# Patient Record
Sex: FEMALE | Race: WHITE | Employment: FULL TIME
[De-identification: names, ages, dates, MRNs, and addresses within clinical notes are randomized per-mention and may not be internally consistent; named-entity substitution may affect disease eponyms.]

---

## 2017-07-01 ENCOUNTER — HEALTH MAINTENANCE LETTER (OUTPATIENT)
Age: 52
End: 2017-07-01

## 2019-10-03 ENCOUNTER — HEALTH MAINTENANCE LETTER (OUTPATIENT)
Age: 54
End: 2019-10-03

## 2020-11-07 ENCOUNTER — HEALTH MAINTENANCE LETTER (OUTPATIENT)
Age: 55
End: 2020-11-07

## 2020-12-25 ENCOUNTER — HOSPITAL ENCOUNTER (EMERGENCY)
Facility: CLINIC | Age: 55
Discharge: HOME | End: 2020-12-25
Attending: PHYSICIAN ASSISTANT | Admitting: PHYSICIAN ASSISTANT
Payer: COMMERCIAL

## 2020-12-25 VITALS
BODY MASS INDEX: 35.54 KG/M2 | SYSTOLIC BLOOD PRESSURE: 141 MMHG | RESPIRATION RATE: 16 BRPM | TEMPERATURE: 97.4 F | HEART RATE: 72 BPM | OXYGEN SATURATION: 97 % | DIASTOLIC BLOOD PRESSURE: 59 MMHG | WEIGHT: 158 LBS | HEIGHT: 56 IN

## 2020-12-25 DIAGNOSIS — S05.01XA ABRASION OF RIGHT CORNEA, INITIAL ENCOUNTER: ICD-10-CM

## 2020-12-25 PROCEDURE — 99283 EMERGENCY DEPT VISIT LOW MDM: CPT

## 2020-12-25 RX ORDER — ERYTHROMYCIN 5 MG/G
0.5 OINTMENT OPHTHALMIC 4 TIMES DAILY
Qty: 1 TUBE | Refills: 0 | Status: SHIPPED | OUTPATIENT
Start: 2020-12-25 | End: 2020-12-30

## 2020-12-25 RX ORDER — PROPARACAINE HYDROCHLORIDE 5 MG/ML
SOLUTION/ DROPS OPHTHALMIC
Status: DISCONTINUED
Start: 2020-12-25 | End: 2020-12-25 | Stop reason: HOSPADM

## 2020-12-25 RX ORDER — PROPARACAINE HYDROCHLORIDE 5 MG/ML
1 SOLUTION/ DROPS OPHTHALMIC ONCE
Status: DISCONTINUED | OUTPATIENT
Start: 2020-12-25 | End: 2020-12-25 | Stop reason: HOSPADM

## 2020-12-25 ASSESSMENT — ENCOUNTER SYMPTOMS: EYE DISCHARGE: 1

## 2020-12-25 ASSESSMENT — VISUAL ACUITY
OS: 20/40
OD: 20/70

## 2020-12-25 ASSESSMENT — MIFFLIN-ST. JEOR: SCORE: 1169.68

## 2020-12-25 NOTE — ED AVS SNAPSHOT
[facility] Emergency Dept  [address] [facility] [address]  Phone: [phone]  Fax: [phone]                                    Karina Rodrigues   MRN: [id number]    Department: [facility] Emergency Dept   Date of Visit: 12/25/2020           After Visit Summary Signature Page    I have received my discharge instructions, and my questions have been answered. I have discussed any challenges I see with this plan with the nurse or doctor.    ..........................................................................................................................................  Patient/Patient Representative Signature      ..........................................................................................................................................  Patient Representative Print Name and Relationship to Patient    ..................................................               ................................................  Date                                   Time    ..........................................................................................................................................  Reviewed by Signature/Title    ...................................................              ..............................................  Date                                               Time          22EPIC Rev 08/18

## 2020-12-25 NOTE — ED TRIAGE NOTES
Fell on Wednesday, got sutures at .  While getting sutures the pt was poked with the needle in her right eye.

## 2020-12-25 NOTE — ED PROVIDER NOTES
"  History   Chief Complaint:  Eye Problem       HPI   Karina Rodrigues is a 55 year old female who presents with eye problem. The patient went to urgent care today to have her sutures removed from her upper lip and was poked or scratched with the numbing syringe in the right eye. She states that she has been having blurry vision in her right eye and watery discharge from the right eye since. She also notes that it feels like there is something in her right eye as well. She does not wear any contacts. Last tetanus was 2012.     Review of Systems   Eyes: Positive for discharge (right) and visual disturbance (blurry vision in right eye).   All other systems reviewed and are negative.       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     Physical Exam     Patient Vitals for the past 24 hrs:   BP Temp Temp src Pulse Resp SpO2 Height Weight   12/25/20 1433 (!) 141/59 97.4  F (36.3  C) Temporal 72 16 97 % 1.422 m (4' 8\") 71.7 kg (158 lb)       Physical Exam  General:         Well appearing.  No acute distress.  HENT:             Scalp AT/NC.  External nose and ears normal.  Eyes:               PEERL, Extra occular movements intact without pain.  No proptosis.                          The right eye is mildly injected, No lid swelling, erythema or tenderness to palpation. No discharge or crusting of lashes. No periorbital edema.                          Slit lamp examination reveals no foreign bodies present with eversion of " lids. No identifyable opacity or flare in anterior chamber.  Exam of eye with fluoroscein dye reveals thin ~2 mm centrally located corneal abrasion.  Negative seidels sign.  No dendritic lesions, ulcers.                          Right Eye        20/70                             Left Eye           20/40  CV:                  Regular rate and rhythm                          No pathologic murmur, rubs, or gallops.  Resp:              Breath sounds are clear bilaterally.   Non-labored.  Neuro: Alert and Oriented.                          GCS: 15                          CN II-XII grossly intact.   Skin:               Sutured laceration to outer lip present.  No redness or pus.  No rashes or lesions.  Psych:            Awake, alert, appropriate interactions.       Emergency Department Course     Emergency Department Course:     Reviewed:  I reviewed the patient's nursing notes, vitals, past medical records, Care Everywhere.      Assessments:  1441     I performed an exam of the patient as documented above.      Interventions:  Alcaine 1 drop  Ful-yaneli strip 600 mcg      Disposition:  Discharged to home.    Impression & Plan   Medical Decision Making:  Patient presents with right eye pain after scratched with needle at . Broad differential was considered.  The exam is significant for abnormality on fluorescein exam. This is consistent with corneal abrasion. No foreign bodies in eyes or lids noted.  No corneal ulcers.  No evidence to suggest more serious traumatic injury such as: ruptured globe, hyphema, fracture, lens dislocation.  No definite signs of anterior chamber involvement such as endopthalmitis at this point.     Patient will be placed on antibiotic drops as below.  Discussed symptomatic treatment for pain control. Patient was given follow-up for ophthalmology, with instruction to schedule an appointment to be seen in 1-2 days.  Return to ED if new or worsening symptoms. Tetanus UTD.    Diagnosis:    ICD-10-CM     1. Abrasion of right cornea, initial encounter  S05.01XA        Discharge Medications:  Discharge Medication List as of 12/25/2020  2:59 PM      START taking these medications    Details   erythromycin (ROMYCIN) 5 MG/GM ophthalmic ointment Place 0.5 inches into the right eye 4 times daily for 5 daysDisp-1 Tube, [id number]             Scribe Disclosure:  I, Ricco Ellison, am serving as a scribe at 2:41 PM on 12/25/2020 to document services personally performed by Will Leon PA based on my observations and the provider's statements to me.         Will Leon PA-C  12/25/20 8917       Will Leon PA-C  12/25/20 8527

## 2020-12-25 NOTE — ED NOTES
"  History   Chief Complaint:  Eye Problem       HPI   Karina Rodrigues is a 55 year old female who presents with eye problem. The patient went to urgent care today to have her sutures removed from her upper lip and was poked or scratched with the numbing syringe in the right eye. She states that she has been having blurry vision in her right eye and watery discharge from the right eye since. She also notes that it feels like there is something in her right eye as well. She does not wear any contacts. Last tetanus was 2012.     Review of Systems   Eyes: Positive for discharge (right) and visual disturbance (blurry vision in right eye).   All other systems reviewed and are negative.     Allergies:  Adhesive Tape  Cephalexin  Clindamycin  Keppra [Levetiracetam]  Seasonal Allergies    Medications:  zoloft     Past Medical History:    Depression   Asthma   PCOS  Carpal tunnel syndrome   Uterine leiomyoma     Past Surgical History:    Cholecystectomy   Hysterectomy   Excision pilonidal lesion   Small intestine surgery   stereotactic bx R temporoparietal astrocytoma, post op hemorrhage w/ craniotomy(no tumor resection)     Family History:    Diabetes  breast cancer   GI disease     Social History:  The patient presents alone.     Physical Exam     Patient Vitals for the past 24 hrs:   BP Temp Temp src Pulse Resp SpO2 Height Weight   12/25/20 1433 (!) 141/59 97.4  F (36.3  C) Temporal 72 16 97 % 1.422 m (4' 8\") 71.7 kg (158 lb)       Physical Exam  General: Well appearing.  No acute distress.  HENT:  Scalp AT/NC.  External nose and ears normal.  Eyes:  PEERL, Extra occular movements intact without pain.  No proptosis.    The right eye is mildly injected, No lid swelling, erythema or tenderness to palpation. No discharge or crusting of lashes. No periorbital edema.    Slit lamp examination reveals no foreign bodies present with eversion of lids. No identifyable opacity or flare in anterior chamber.  Exam of eye with " fluoroscein dye reveals thin ~2 mm centrally located corneal abrasion.  Negative seidels sign.  No dendritic lesions, ulcers.    Right Eye 20/70     Left Eye 20/40  CV:  Regular rate and rhythm    No pathologic murmur, rubs, or gallops.  Resp:  Breath sounds are clear bilaterally.   Non-labored.  Neuro: Alert and Oriented.    GCS: 15    CN II-XII grossly intact.   Skin:  Sutured laceration to outer lip present.  No redness or pus.  No rashes or lesions.  Psych: Awake, alert, appropriate interactions.        Emergency Department Course     Emergency Department Course:    Reviewed:  I reviewed the patient's nursing notes, vitals, past medical records, Care Everywhere.     Assessments:  1441  I performed an exam of the patient as documented above.     Interventions:  Alcaine 1 drop  Ful-yaneli strip 600 mcg     Disposition:  Discharged to home.      Impression & Plan     Medical Decision Making:  Patient presents with right eye pain after scratched with needle at . Broad differential was considered.  The exam is significant for abnormality on fluorescein exam. This is consistent with corneal abrasion. No foreign bodies in eyes or lids noted.  No corneal ulcers.  No evidence to suggest more serious traumatic injury such as: ruptured globe, hyphema, fracture, lens dislocation.  No definite signs of anterior chamber involvement such as endopthalmitis at this point.    Patient will be placed on antibiotic drops as below.  Discussed symptomatic treatment for pain control. Patient was given follow-up for ophthalmology, with instruction to schedule an appointment to be seen in 1-2 days.  Return to ED if new or worsening symptoms. Tetanus UTD.    Diagnosis:    ICD-10-CM    1. Abrasion of right cornea, initial encounter  S05.01XA        Discharge Medications:  New Prescriptions    ERYTHROMYCIN (ROMYCIN) 5 MG/GM OPHTHALMIC OINTMENT    Place 0.5 inches into the right eye 4 times daily for 5 days       Scribe Disclosure:  Ricco TAPIA  isacc Ellison serving as a scribe at 2:41 PM on 12/25/2020 to document services personally performed by Will Leon PA based on my observations and the provider's statements to me.        Will Leon, PA-C  12/25/20 1451

## 2021-09-05 ENCOUNTER — HEALTH MAINTENANCE LETTER (OUTPATIENT)
Age: 56
End: 2021-09-05

## 2021-10-31 ENCOUNTER — HEALTH MAINTENANCE LETTER (OUTPATIENT)
Age: 56
End: 2021-10-31

## 2021-12-26 ENCOUNTER — HEALTH MAINTENANCE LETTER (OUTPATIENT)
Age: 56
End: 2021-12-26

## 2022-10-23 ENCOUNTER — HEALTH MAINTENANCE LETTER (OUTPATIENT)
Age: 57
End: 2022-10-23

## 2023-04-02 ENCOUNTER — HEALTH MAINTENANCE LETTER (OUTPATIENT)
Age: 58
End: 2023-04-02

## 2023-11-11 ENCOUNTER — HEALTH MAINTENANCE LETTER (OUTPATIENT)
Age: 58
End: 2023-11-11